# Patient Record
Sex: MALE | Race: WHITE
[De-identification: names, ages, dates, MRNs, and addresses within clinical notes are randomized per-mention and may not be internally consistent; named-entity substitution may affect disease eponyms.]

---

## 2017-03-30 ENCOUNTER — HOSPITAL ENCOUNTER (OUTPATIENT)
Dept: HOSPITAL 75 - RAD | Age: 62
End: 2017-03-30
Attending: NURSE PRACTITIONER
Payer: COMMERCIAL

## 2017-03-30 DIAGNOSIS — S83.231A: Primary | ICD-10-CM
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
## 2017-03-30 PROCEDURE — 73721 MRI JNT OF LWR EXTRE W/O DYE: CPT

## 2017-03-30 NOTE — DIAGNOSTIC IMAGING REPORT
PROCEDURE: MRI left joint lower extremity without contrast.



TECHNIQUE: Multiplanar, multisequence non contrast-enhanced MRI

of the left lower extremity was accomplished.



INDICATION: Fall.



FINDINGS:



There is a moderate suprapatellar joint effusion. Minimal amount

of fluid in the popliteal fossa is seen suggestive of a ruptured

small Baker's cyst. There is subcutaneous edema around the knee

most prominent along the medial aspect above the joint line and

along the MCL without disruption of the MCL fibers compatible

with a mild sprain. The extensor mechanism is intact. There is

no ACL or PCL tear. Slight increased signal in the ACL is

probably related to mucinous degeneration.



There is a complex tear involving the posterior horn and the

body of the medial meniscus with a nondisplaced tear also seen

in the anterior horn. The lateral meniscus demonstrates

increased signal in the posterior horn and the posterior root

may relate to degeneration with no definitive tear. The lateral

collateral ligament complex appears intact.



There is no significant marrow contusion. There is a subchondral

cyst formed in the posteromedial aspect of the medial plateau

with a cyst measuring 8 mm.



In the medial compartment, there is generalized mild to moderate

thinning of the cartilage with associated cartilage fissuring.

There is mild fissuring in the cartilage of the lateral

compartment with no significant overall thinning. There is mild

thinning of the cartilage in the patellofemoral compartment with

mild fissuring seen.



IMPRESSION:

1. Complex tear in the medial meniscus.

2. Mild MCL sprain.



Dictated by:



Dictated on workstation # [id number]

## 2018-09-21 ENCOUNTER — HOSPITAL ENCOUNTER (OUTPATIENT)
Dept: HOSPITAL 75 - RAD | Age: 63
End: 2018-09-21
Attending: NURSE PRACTITIONER
Payer: COMMERCIAL

## 2018-09-21 DIAGNOSIS — R22.1: Primary | ICD-10-CM

## 2018-09-21 PROCEDURE — 76536 US EXAM OF HEAD AND NECK: CPT

## 2018-09-21 NOTE — DIAGNOSTIC IMAGING REPORT
Ultrasound of the soft tissues.



Indication:  Mass to right posterior neck.



There are no prior studies available for comparison.



By history, the patient has palpable abnormality in the soft

tissues posterior to the lower neck on the right. The ultrasound

examination does show fairly well-circumscribed 1.2 x 0.6 x 0.9

cm avascular hypoechoic lesion with internal echoes. This does

have through-transmission.  I suspect that this is a small benign

lesion such as a slightly complicated cyst or perhaps a sebaceous

cyst.



No other abnormalities identified. 



Impression:  The patient's palpable abnormality is most likely a

benign process such as a slightly complicated cyst or perhaps a

sebaceous cyst. Clinical followup is recommended. 



Dictated by: 



  Dictated on workstation # [id number]

## 2023-03-15 ENCOUNTER — HOSPITAL ENCOUNTER (OUTPATIENT)
Dept: HOSPITAL 75 - PREOP | Age: 68
End: 2023-03-15
Attending: SURGERY
Payer: COMMERCIAL

## 2023-03-15 VITALS — WEIGHT: 229.94 LBS | HEIGHT: 67.99 IN | BODY MASS INDEX: 34.85 KG/M2

## 2023-03-15 DIAGNOSIS — Z01.818: Primary | ICD-10-CM

## 2023-03-15 DIAGNOSIS — Z12.11: ICD-10-CM

## 2023-03-24 ENCOUNTER — HOSPITAL ENCOUNTER (OUTPATIENT)
Dept: HOSPITAL 75 - ENDO | Age: 68
Discharge: HOME | End: 2023-03-24
Attending: SURGERY
Payer: COMMERCIAL

## 2023-03-24 VITALS — BODY MASS INDEX: 34.85 KG/M2 | HEIGHT: 67.99 IN | WEIGHT: 229.94 LBS

## 2023-03-24 VITALS — DIASTOLIC BLOOD PRESSURE: 62 MMHG | SYSTOLIC BLOOD PRESSURE: 124 MMHG

## 2023-03-24 VITALS — SYSTOLIC BLOOD PRESSURE: 93 MMHG | DIASTOLIC BLOOD PRESSURE: 50 MMHG

## 2023-03-24 VITALS — DIASTOLIC BLOOD PRESSURE: 55 MMHG | SYSTOLIC BLOOD PRESSURE: 91 MMHG

## 2023-03-24 VITALS — SYSTOLIC BLOOD PRESSURE: 132 MMHG | DIASTOLIC BLOOD PRESSURE: 66 MMHG

## 2023-03-24 VITALS — SYSTOLIC BLOOD PRESSURE: 91 MMHG | DIASTOLIC BLOOD PRESSURE: 55 MMHG

## 2023-03-24 DIAGNOSIS — K57.30: ICD-10-CM

## 2023-03-24 DIAGNOSIS — E66.9: ICD-10-CM

## 2023-03-24 DIAGNOSIS — Z12.11: Primary | ICD-10-CM

## 2023-03-24 PROCEDURE — 82947 ASSAY GLUCOSE BLOOD QUANT: CPT

## 2023-03-24 NOTE — OPERATIVE REPORT
DATE OF SERVICE: 03/24/2023



PREOPERATIVE DIAGNOSIS:

Screening colonoscopy.



POSTOPERATIVE DIAGNOSIS:

Normal colon, diverticulosis.



PROCEDURE:

Colonoscopy.



SURGEON:

Conor Mckeon DO.



ANESTHESIA:

Per [facility].



ESTIMATED BLOOD LOSS:

None.



COMPLICATIONS:

None.



INDICATIONS:

The patient is a 67-year-old male, needing screening colonoscopy.  He 

understands risks and benefits of procedure and wished to proceed.  Consent was 

signed in chart.



DESCRIPTION OF PROCEDURE:

The patient was taken to endoscopy suite, placed in left lateral recumbent 

position.  Timeout was performed.  Digital rectal exam was performed.  No 

palpable polyps, masses or ulcerations.  Scope was inserted in the rectum and 

advanced all the way to the cecum with minimal difficulty.  Prep was adequate.  

Scope was then slowly retracted back.  No polyps, masses or ulcerations in the 

cecum, ascending, transverse, descending and sigmoid colon.  Sigmoid colon had 

some diverticulosis present.  Scope was then continuously retracted back into 

the rectum where it was also retroflexed, noting no other pathology.  Scope was 

returned to its normal position, slowly withdrawn until completely removed.  The

patient tolerated the procedure well without complication and was taken to the 

recovery room in stable condition.



RECOMMENDATIONS:

The patient will need to increase fiber due to diverticulosis.  We would 

recommend repeat colonoscopy in 10 years unless family history of colon cancer, 

which was then be 5 years.  Any issues before that, be seen at that time.





Job ID: [id number]

DocumentID: [id number]

Dictated Date: 03/24/2023 14:22:44

Transcription Date: 03/24/2023 20:10:00

Dictated By: CONOR MCKEON DO

## 2023-03-24 NOTE — PROGRESS NOTE-POST OPERATIVE
Post-Operative Progess Note


Surgeon (s)/Assistant (s)


Surgeon


CONOR DENIS DO


Assistant:  na





Pre-Operative Diagnosis


Screening colonoscopy





Post-Operative Diagnosis





Diverticulosis





Procedure & Operative Findings


Date of Procedure


3/24/23


Procedure Performed/Findings


Colonoscopy


Anesthesia Type


per [facility]





Estimated Blood Loss


Estimated blood loss (mL):  none





Specimens/Packing


Specimens Removed


none











CONOR DENIS DO              Mar 24, 2023 13:35

## 2023-03-24 NOTE — DISCHARGE INST-SIMPLE/STANDARD
Discharge Inst-Standard


Patient Instructions/Follow Up


Plan of Care/Instructions/FU:  


10 years Mike, unless family history of colon cancer, then see Dr. Mckeon in 5 years. Any other issues, be seen at that time


Activity as Tolerated:  Yes


Discharge Diet:  No Restrictions











CONOR MCKEON DO              Mar 24, 2023 13:37

## 2023-03-24 NOTE — ANESTHESIA-GENERAL POST-OP
MAC


Patient Condition


Mental Status/LOC:  Same as Preop


Cardiovascular:  Satisfactory


Nausea/Vomiting:  Absent


Respiratory:  Satisfactory


Pain:  Controlled


Complications:  Absent





Post Op Complications


Complications


None





Follow Up Care/Instructions


Patient Instructions


None needed.





Anesthesiology Discharge Order


Discharge Order


Patient is doing well, no complaints, stable vital signs, no apparent adverse 

anesthesia problems.   


No complications reported per nursing.











BRIAN DORAN 24, 2023 14:31